# Patient Record
Sex: MALE | Race: WHITE
[De-identification: names, ages, dates, MRNs, and addresses within clinical notes are randomized per-mention and may not be internally consistent; named-entity substitution may affect disease eponyms.]

---

## 2018-03-06 ENCOUNTER — HOSPITAL ENCOUNTER (INPATIENT)
Dept: HOSPITAL 92 - SURG A | Age: 63
LOS: 15 days | Discharge: HOME | DRG: 470 | End: 2018-03-21
Attending: ORTHOPAEDIC SURGERY | Admitting: ORTHOPAEDIC SURGERY
Payer: COMMERCIAL

## 2018-03-06 ENCOUNTER — HOSPITAL ENCOUNTER (OUTPATIENT)
Dept: HOSPITAL 92 - LABBT | Age: 63
Discharge: HOME | End: 2018-03-06
Attending: ORTHOPAEDIC SURGERY
Payer: COMMERCIAL

## 2018-03-06 VITALS — BODY MASS INDEX: 30.3 KG/M2

## 2018-03-06 DIAGNOSIS — I25.10: ICD-10-CM

## 2018-03-06 DIAGNOSIS — Z01.818: Primary | ICD-10-CM

## 2018-03-06 DIAGNOSIS — M17.12: Primary | ICD-10-CM

## 2018-03-06 DIAGNOSIS — R73.03: ICD-10-CM

## 2018-03-06 DIAGNOSIS — M17.12: ICD-10-CM

## 2018-03-06 DIAGNOSIS — E05.90: ICD-10-CM

## 2018-03-06 DIAGNOSIS — Z96.651: ICD-10-CM

## 2018-03-06 DIAGNOSIS — I73.9: ICD-10-CM

## 2018-03-06 DIAGNOSIS — Z79.01: ICD-10-CM

## 2018-03-06 DIAGNOSIS — I10: ICD-10-CM

## 2018-03-06 DIAGNOSIS — G47.00: ICD-10-CM

## 2018-03-06 DIAGNOSIS — F32.9: ICD-10-CM

## 2018-03-06 DIAGNOSIS — E78.5: ICD-10-CM

## 2018-03-06 DIAGNOSIS — Z87.898: ICD-10-CM

## 2018-03-06 DIAGNOSIS — I48.0: ICD-10-CM

## 2018-03-06 PROCEDURE — C1713 ANCHOR/SCREW BN/BN,TIS/BN: HCPCS

## 2018-03-06 PROCEDURE — A4216 STERILE WATER/SALINE, 10 ML: HCPCS

## 2018-03-06 PROCEDURE — 93010 ELECTROCARDIOGRAM REPORT: CPT

## 2018-03-06 PROCEDURE — C1776 JOINT DEVICE (IMPLANTABLE): HCPCS

## 2018-03-06 PROCEDURE — A4306 DRUG DELIVERY SYSTEM <=50 ML: HCPCS

## 2018-03-06 PROCEDURE — 93005 ELECTROCARDIOGRAM TRACING: CPT

## 2018-03-06 PROCEDURE — 36415 COLL VENOUS BLD VENIPUNCTURE: CPT

## 2018-03-06 PROCEDURE — 87081 CULTURE SCREEN ONLY: CPT

## 2018-03-06 PROCEDURE — 85027 COMPLETE CBC AUTOMATED: CPT

## 2018-03-08 NOTE — EKG
Test Reason : 

Blood Pressure : ***/*** mmHG

Vent. Rate : 067 BPM     Atrial Rate : 067 BPM

   P-R Int : 152 ms          QRS Dur : 106 ms

    QT Int : 408 ms       P-R-T Axes : 052 073 066 degrees

   QTc Int : 431 ms

 

Normal sinus rhythm

Normal ECG

When compared with ECG of 18-APR-2014 06:52,

T wave amplitude has decreased in Anterior leads

Confirmed by DR. YOAN GUTIERRES ([id number]) on 3/8/2018 5:32:41 PM

 

Referred By:  TIN           Confirmed By:DR. YOAN GUTIERRES

## 2018-03-13 ENCOUNTER — HOSPITAL ENCOUNTER (OUTPATIENT)
Dept: HOSPITAL 92 - LABBT | Age: 63
Discharge: HOME | End: 2018-03-13
Attending: ORTHOPAEDIC SURGERY
Payer: COMMERCIAL

## 2018-03-13 DIAGNOSIS — M17.12: ICD-10-CM

## 2018-03-13 DIAGNOSIS — Z01.818: Primary | ICD-10-CM

## 2018-03-13 LAB
ANION GAP SERPL CALC-SCNC: 13 MMOL/L (ref 10–20)
APTT PPP: 29.8 SEC (ref 22.9–36.1)
BUN SERPL-MCNC: 21 MG/DL (ref 8.4–25.7)
CALCIUM SERPL-MCNC: 9.4 MG/DL (ref 7.8–10.44)
CHLORIDE SERPL-SCNC: 104 MMOL/L (ref 98–107)
CO2 SERPL-SCNC: 27 MMOL/L (ref 23–31)
CREAT CL PREDICTED SERPL C-G-VRATE: 0 ML/MIN (ref 70–130)
CRYSTAL-AUWI FLAG: 0 (ref 0–15)
GLUCOSE SERPL-MCNC: 143 MG/DL (ref 80–115)
HEV IGM SER QL: 0.5 (ref 0–7.99)
HGB BLD-MCNC: 15.3 G/DL (ref 14–18)
HYALINE CASTS #/AREA URNS LPF: (no result) LPF
INR PPP: 1
MCH RBC QN AUTO: 31 PG (ref 27–31)
MCV RBC AUTO: 92.9 FL (ref 80–94)
PATHC CAST-AUWI FLAG: 0 (ref 0–2.49)
PLATELET # BLD AUTO: 271 THOU/UL (ref 130–400)
POTASSIUM SERPL-SCNC: 4.1 MMOL/L (ref 3.5–5.1)
PROTHROMBIN TIME: 13.7 SEC (ref 12–14.7)
RBC # BLD AUTO: 4.94 MILL/UL (ref 4.7–6.1)
RBC UR QL AUTO: (no result) HPF (ref 0–3)
SODIUM SERPL-SCNC: 140 MMOL/L (ref 136–145)
SP GR UR STRIP: 1.03 (ref 1–1.04)
SPERM-AUWI FLAG: 0 (ref 0–9.9)
WBC # BLD AUTO: 6.7 THOU/UL (ref 4.8–10.8)
WBC UR QL AUTO: (no result) HPF (ref 0–3)
YEAST-AUWI FLAG: 0 (ref 0–25)

## 2018-03-13 PROCEDURE — 85027 COMPLETE CBC AUTOMATED: CPT

## 2018-03-13 PROCEDURE — 81001 URINALYSIS AUTO W/SCOPE: CPT

## 2018-03-13 PROCEDURE — 85610 PROTHROMBIN TIME: CPT

## 2018-03-13 PROCEDURE — 87081 CULTURE SCREEN ONLY: CPT

## 2018-03-13 PROCEDURE — 85730 THROMBOPLASTIN TIME PARTIAL: CPT

## 2018-03-13 PROCEDURE — 80048 BASIC METABOLIC PNL TOTAL CA: CPT

## 2018-03-13 PROCEDURE — 86901 BLOOD TYPING SEROLOGIC RH(D): CPT

## 2018-03-13 PROCEDURE — 86900 BLOOD TYPING SEROLOGIC ABO: CPT

## 2018-03-13 PROCEDURE — 86850 RBC ANTIBODY SCREEN: CPT

## 2018-03-15 NOTE — HP
HISTORY OF PRESENT ILLNESS:  The patient is a 62-year-old male with a long history of progressive deg
enerative arthritis of the left knee and wake up responsive, conservative treatment including rest, r
estriction of activities, pain medication, several injections, and lifestyle adjustments.  The pain i
s now interfering with day-to-day activities including walking, working, getting dressed.

 

PAST MEDICAL HISTORY:  Please see the old chart.  The patient has had previous right total knee repla
cement 5 years ago with good results.  He has a history of hypertension, atherosclerotic cardiovascul
ar disease, and has bilateral lower extremity stents for which he takes Plavix.  He also has a histor
y of possible atrial fibrillation, hypothyroidism, and hepatitis A.

 

CURRENT MEDICATIONS:  _____, Crestor, Plavix, which he stopped 1 week preoperatively.  Lisinopril, Mi
rapex, and Flomax, Ambien, Wellbutrin, and hydrocodone.

 

FAMILY HISTORY, SOCIAL HISTORY, REVIEW OF SYSTEMS:  Otherwise unremarkable.

 

PHYSICAL EXAMINATION:

GENERAL:  He is a healthy male.

HEENT:  Unremarkable.

NECK:  Supple.

CHEST:  Clear.

HEART:  Regular rate and rhythm.

ABDOMEN:  Soft, nontender.

RECTAL/GENITAL:  Deferred.

EXTREMITIES:  Pertinent findings on the left knee.  There is puffiness but no definite effusion.  The
re is mild varus deformity.  There is tenderness over the medial joint line.  Range of motion is 5-11
5 degrees.  There is _____ flexion.  There is no instability.  I cannot palpate distal pulses, but go
od capillary refill.  He has a left antalgic gait.

NEUROVASCULAR:  Intact.

 

LABORATORY AND X-RAY FINDINGS:  X-rays of left knee reveal tricompartmental DJD and narrowing mediall
y and laterally and benign chondral lesion of the proximal tibia, which is unchanged on serial x-rays
.

 

IMPRESSION:

1.  Degenerative joint disease, left knee.

2.  Status post right total knee replacement.

3.  Peripheral vascular disease with bilateral lower extremity stents.

4.  History of hypertension.

 

PLAN:  Left total knee replacement.  The nature of the surgery, length of recovery, and potential com
plications such as infection, loss of motion, incomplete relief, delayed wound healing, neurovascular
 injury, thromboembolic phenomena, possible transfusion, and need for revision discussed in detail.  
Because of the previous vascular stents, we will plan on not using a tourniquet during the procedure.

## 2018-03-19 PROCEDURE — 0SRD0J9 REPLACEMENT OF LEFT KNEE JOINT WITH SYNTHETIC SUBSTITUTE, CEMENTED, OPEN APPROACH: ICD-10-PCS | Performed by: ORTHOPAEDIC SURGERY

## 2018-03-19 PROCEDURE — 3E0T3BZ INTRODUCTION OF ANESTHETIC AGENT INTO PERIPHERAL NERVES AND PLEXI, PERCUTANEOUS APPROACH: ICD-10-PCS | Performed by: ORTHOPAEDIC SURGERY

## 2018-03-19 RX ADMIN — HYDROCODONE BITARTRATE AND ACETAMINOPHEN PRN TAB: 10; 325 TABLET ORAL at 11:40

## 2018-03-19 RX ADMIN — HYDROCODONE BITARTRATE AND ACETAMINOPHEN PRN TAB: 10; 325 TABLET ORAL at 20:48

## 2018-03-19 RX ADMIN — Medication SCH: at 20:53

## 2018-03-19 RX ADMIN — Medication SCH: at 10:54

## 2018-03-19 RX ADMIN — ASPIRIN SCH MG: 81 TABLET ORAL at 20:49

## 2018-03-19 RX ADMIN — Medication SCH GM: at 16:20

## 2018-03-19 RX ADMIN — HYDROCODONE BITARTRATE AND ACETAMINOPHEN PRN TAB: 10; 325 TABLET ORAL at 16:31

## 2018-03-19 NOTE — OP
DATE OF PROCEDURE:  03/19/2018

 

SURGEON:  Barry Galvin M.D.

 

ASSISTANT:  WENDY Gonsalez.

 

ANESTHESIA:  General plus femoral sciatic nerve block.

 

PREOPERATIVE DIAGNOSIS:  Degenerative arthritis, left knee.

 

POSTOPERATIVE DIAGNOSIS:  Degenerative arthritis, left knee.

 

PROCEDURES:  His Left total knee replacement with computer-assisted navigation with cemented [employer] 
Triathlon components (#6 femur, #6 universal tibial baseplate with 11 mm CS plastic insert, and A38 a
ll plastic patellar component).  

 

NARRATIVE REPORT:  After satisfactory anesthesia was induced in the supine position, the patient was 
prepped and draped in routine manner.  Sequential compression device was used on the non-operative le
g throughout the procedure.  The tourniquet was not used.  He was approached through a gently curved 
medial parapatellar incision, it was carried down to subcutaneous tissues.  Bleeding points controlle
d with Bovie cautery.  Medial parapatellar arthrotomy was performed, patella dislocated laterally and
 portions of the fat pad were excised for exposure.  There was marked degenerative arthritis of the k
nee, especially medially, with areas of exposed bone.  Meniscal remnants and osteophytes were removed
.  Using the [employer] pinless navigation system and the appropriate guides, the distal femoral and pro
ximal tibial articular surfaces were excised to accept the trial components.  It was felt that #6 fem
oral component, #6 tibial baseplate with 9 mm CS plastic insert gave appropriate size, fit, and stabi
lity.  The patellar articular surface was excised to accept an all plastic A38 patellar component.  T
here was good range of motion and good patellar tracking.  The trial components removed.  The knee wa
s copiously irrigated with the pulsatile lavage and bony surfaces thoroughly cleaned and dried.  The 
permanent components were then cemented in a single stage using 1 package of cement premixed with 1 g
tomer of tobramycin powder.  Excess cement was removed.  There was again good fit and stability.  The w
ound was copiously irrigated.  The skin was infiltrated with a mixture 0.25% Marcaine with epinephrin
e and 1% lidocaine with epinephrine.  The medial retinaculum and quadriceps mechanism was closed with
 interrupted #2 Vicryl and a running #2 Quill.  Subcutaneous tissues were closed with a running subcu
ticular 0 Quill and the skin closed with a running subcuticular 3-0 Monoderm and SurgiSeal skin adhes
kerrie.  A sterile bulky compressive dressing was applied and the patient awakened, taken to recovery ro
om in stable condition.  There were no apparent intraoperative complications.  The estimated blood lo
ss was 250 mL.  The tourniquet time was zero.

## 2018-03-19 NOTE — RAD
LEFT KNEE 2 VIEWS:

 

Date:  03/19/18 

 

HISTORY:  

Status post arthroplasty. 

 

COMPARISON:  

None. 

 

FINDINGS:

There are postoperative changes compatible with left knee arthroplasty. Alignment is near anatomic. V
ascular stent is noted. 

 

IMPRESSION: 

Postoperative changes. 

 

 

POS: [facility]

## 2018-03-19 NOTE — PDOC.PN
- Subjective


Encounter Start Date: 03/19/18


Encounter Start Time: 11:00


Subjective: no chest pain or sob


-: left leg is still numb


-: is able to move toes and extremities, has nerve block





- Objective


MAR Reviewed: Yes


Vital Signs & Weight: 


 Weight











Weight                         230 lb

















Phys Exam





- Physical Examination


HEENT: PERRLA, moist MMs


Neck: no JVD, supple


Respiratory: no wheezing, no rales


Cardiovascular: RRR, no significant murmur


Gastrointestinal: soft, non-tender, positive bowel sounds


Musculoskeletal: no edema, pulses present


Neurological: non-focal, moves all 4 limbs


Psychiatric: A&O x 3





Dx/Plan


(1) Status post left knee replacement


Code(s): Z96.652 - PRESENCE OF LEFT ARTIFICIAL KNEE JOINT   Status: Acute   





(2) PVD (peripheral vascular disease)


Code(s): I73.9 - PERIPHERAL VASCULAR DISEASE, UNSPECIFIED   Status: Chronic   

Comment: stents in B/L LE-   





(3) HTN (hypertension)


Code(s): I10 - ESSENTIAL (PRIMARY) HYPERTENSION   Status: Chronic   


Qualifiers: 


   Hypertension type: essential hypertension   Qualified Code(s): I10 - 

Essential (primary) hypertension   





(4) Dyslipidemia


Code(s): E78.5 - HYPERLIPIDEMIA, UNSPECIFIED   Status: Chronic   





(5) Hyperthyroidism


Code(s): E05.90 - THYROTOXICOSIS, UNSP WITHOUT THYROTOXIC CRISIS OR STORM   

Status: Chronic   Comment: f/u with Dr.Zia hernandez in [facility]   





(6) Atrial fibrillation


Code(s): I48.91 - UNSPECIFIED ATRIAL FIBRILLATION   Status: Chronic   


Qualifiers: 


   Atrial fibrillation type: paroxysmal   Qualified Code(s): I48.0 - Paroxysmal 

atrial fibrillation   





- Plan


on asp bid for dvt prophylaxis post knee surgery


-: continue lisinopril, methimazole, flomax and crestor


-: ropivacaine nr block, fentanyl, ultram and norco prn for pain


-: will f/u





* .








Review of Systems





- Medications/Allergies


Allergies/Adverse Reactions: 


 Allergies











Allergy/AdvReac Type Severity Reaction Status Date / Time


 


No Known Drug Allergies Allergy   Verified 03/06/18 11:59











Medications: 


 Current Medications





Acetaminophen (Tylenol)  650 mg PO Q4H PRN


   PRN Reason: HA/ T > 101F; Mild Pain (1-3)


Hydrocodone Bitart/Acetaminophen (Norco 10/325)  1 tab PO Q4H PRN


   PRN Reason: Pain (1-3)


Hydrocodone Bitart/Acetaminophen (Norco 10/325)  2 tab PO Q4H PRN


   PRN Reason: PAIN (4-6)


Aspirin (Ecotrin)  81 mg PO BID [facility]


Bupropion HCl (Wellbutrin Xl)  150 mg PO QAM [facility]


Cefazolin Sodium (Ancef)  2 gm SLOW IVP 0000,1600 [facility]


   Stop: 03/20/18 00:01


Diphenhydramine HCl (Benadryl)  25 mg PO Q6H PRN


   PRN Reason: Itching


Fentanyl (Sublimaze)  50 mcg IV Q1H PRN


   PRN Reason:  BREAKTHROUGH PAIN


   Last Admin: 03/19/18 10:30 Dose:  50 mcg


Ferrous Gluconate (Fergon)  324 mg PO BID [facility]


Fluoxetine HCl (Prozac)  20 mg PO DAILY [facility]


Ropivacaine (Ropivacaine 0.2% 550 Ml)  550 mls @ 0 mls/hr NERVE BLCK INF [facility]


   PRN Reason: As Directed


Tranexamic Acid 1,000 mg/ (Sodium Chloride)  110 mls @ 200 mls/hr IVPB ONE [facility]


   Stop: 03/19/18 12:00


Sodium Chloride (Normal Saline 0.9%)  1,000 mls @ 100 mls/hr IV .Q10H [facility]


   Last Admin: 03/19/18 10:54 Dose:  Not Given


Tranexamic Acid 1,000 mg/ (Sodium Chloride)  110 mls @ 200 mls/hr IVPB ONE [facility]


   Stop: 03/19/18 12:00


Vancomycin HCl 1.5 gm/ Sodium (Chloride)  300 mls @ 200 mls/hr IVPB 1800 [facility]


   Stop: 03/19/18 19:29


Iron/Minerals/Multivitamins (Theragran M)  1 tab PO DAILY [facility]


Ketorolac Tromethamine (Toradol)  30 mg IVP Q6H PRN


   PRN Reason: Moderate Pain (4-6)


   Stop: 03/22/18 07:13


Lisinopril (Zestril)  20 mg PO QAM [facility]


Methimazole ()  5 mg PO QAM [facility]


Ondansetron HCl (Zofran)  4 mg IVP Q6H PRN


   PRN Reason: Nausea/Vomiting


Pramipexole Dihydrochloride (Mirapex)  2 mg PO HS [facility]


Promethazine HCl (Phenergan)  12.5 mg IM Q4H PRN


   PRN Reason: Nausea


Rosuvastatin Calcium (Crestor)  20 mg PO HS [facility]


Senna/Docusate Sodium (Senokot S)  2 tab PO BID [facility]


Sodium Chloride (Flush - Normal Saline)  10 ml IVF Q12HR [facility]


   Last Admin: 03/19/18 10:54 Dose:  Not Given


Sodium Chloride (Flush - Normal Saline)  10 ml IVF PRN PRN


   PRN Reason: Saline Flush


Sodium Chloride (Flush - Normal Saline)  10 ml IVF PRN PRN


   PRN Reason: Saline Flush


Tamsulosin HCl (Flomax)  0.4 mg PO HS [facility]


Tramadol HCl (Ultram)  50 mg PO Q6H PRN


   PRN Reason: Mild Pain (1-3)


Tramadol HCl (Ultram)  100 mg PO Q6H PRN


   PRN Reason: Moderate Pain 4-6


Zolpidem Tartrate (Ambien)  5 mg PO HSPRN PRN


   PRN Reason: Insomnia

## 2018-03-20 LAB
HGB BLD-MCNC: 12.7 G/DL (ref 14–18)
MCH RBC QN AUTO: 30.2 PG (ref 27–31)
MCV RBC AUTO: 93.4 FL (ref 80–94)
PLATELET # BLD AUTO: 248 THOU/UL (ref 130–400)
RBC # BLD AUTO: 4.21 MILL/UL (ref 4.7–6.1)
WBC # BLD AUTO: 11.6 THOU/UL (ref 4.8–10.8)

## 2018-03-20 RX ADMIN — HYDROCODONE BITARTRATE AND ACETAMINOPHEN PRN TAB: 10; 325 TABLET ORAL at 05:53

## 2018-03-20 RX ADMIN — FENTANYL CITRATE SCH MLS: 50 INJECTION, SOLUTION INTRAMUSCULAR; INTRAVENOUS at 11:22

## 2018-03-20 RX ADMIN — ASPIRIN SCH MG: 81 TABLET ORAL at 21:31

## 2018-03-20 RX ADMIN — Medication SCH ML: at 21:33

## 2018-03-20 RX ADMIN — DOCUSATE SODIUM 50 MG AND SENNOSIDES 8.6 MG SCH TAB: 8.6; 5 TABLET, FILM COATED ORAL at 08:17

## 2018-03-20 RX ADMIN — MULTIPLE VITAMINS W/ MINERALS TAB SCH TAB: TAB at 08:16

## 2018-03-20 RX ADMIN — HYDROCODONE BITARTRATE AND ACETAMINOPHEN PRN TAB: 10; 325 TABLET ORAL at 02:03

## 2018-03-20 RX ADMIN — Medication SCH ML: at 08:18

## 2018-03-20 RX ADMIN — BUPROPION HYDROCHLORIDE SCH MG: 150 TABLET, FILM COATED, EXTENDED RELEASE ORAL at 08:17

## 2018-03-20 RX ADMIN — ASPIRIN SCH MG: 81 TABLET ORAL at 08:16

## 2018-03-20 RX ADMIN — FENTANYL CITRATE SCH MLS: 50 INJECTION, SOLUTION INTRAMUSCULAR; INTRAVENOUS at 19:17

## 2018-03-20 RX ADMIN — DOCUSATE SODIUM 50 MG AND SENNOSIDES 8.6 MG SCH TAB: 8.6; 5 TABLET, FILM COATED ORAL at 21:31

## 2018-03-20 RX ADMIN — Medication SCH GM: at 00:23

## 2018-03-20 NOTE — PDOC.PN
- Subjective


Encounter Start Date: 03/20/18


Encounter Start Time: 11:00





Patient is seen today, alert and oriented. C/o severe pain to right Knee post Op

, he is given multiple pain meds remains 9/10 intensity, low pain tolerance.





- Objective


MAR Reviewed: Yes


Vital Signs & Weight: 


 Vital Signs (12 hours)











  Temp Pulse Resp BP BP Pulse Ox


 


 03/20/18 15:09  98.2 F  78  16   157/81 H  96


 


 03/20/18 12:00   72    


 


 03/20/18 11:22   72  16   154/71 H  97


 


 03/20/18 11:15  97.5 F L  71  18   164/73 H  94 L


 


 03/20/18 08:17     170/74 H  


 


 03/20/18 07:50  98.0 F  81  18    93 L


 


 03/20/18 07:30  98.0 F  81  18   170/74 H  90 L








 Weight











Admit Weight                   230 lb


 


Weight                         230 lb














I&O: 


 











 03/19/18 03/20/18 03/21/18





 06:59 06:59 06:59


 


Intake Total  2644 


 


Output Total  1600 


 


Balance  1044 











Result Diagrams: 


 03/20/18 05:35





Radiology Reviewed by me: Yes


EKG Reviewed by me: Yes





Phys Exam





- Physical Examination


HEENT: PERRLA, moist MMs


Neck: no nodes, no JVD


Respiratory: no wheezing, no rales


Cardiovascular: RRR, no significant murmur


Gastrointestinal: soft, non-tender


Musculoskeletal: no edema, pulses present





Dx/Plan


(1) Status post left knee replacement


Code(s): Z96.652 - PRESENCE OF LEFT ARTIFICIAL KNEE JOINT   Status: Acute   

Comment: Pain is poorly controlled, Discussed with ortho, plan for PCA morphine

   





(2) Dyslipidemia


Code(s): E78.5 - HYPERLIPIDEMIA, UNSPECIFIED   Status: Chronic   Comment: 

continue with Statin.   





(3) HTN (hypertension)


Code(s): I10 - ESSENTIAL (PRIMARY) HYPERTENSION   Status: Chronic   


Qualifiers: 


   Hypertension type: essential hypertension   Qualified Code(s): I10 - 

Essential (primary) hypertension   


Comment: poorly controlled likely from pain, increased lisinopril to 20mg BID   





(4) Hyperthyroidism


Code(s): E05.90 - THYROTOXICOSIS, UNSP WITHOUT THYROTOXIC CRISIS OR STORM   

Status: Chronic   Comment: f/u with Dr.Zia hernandez in [facility]   





(5) Atrial fibrillation


Code(s): I48.91 - UNSPECIFIED ATRIAL FIBRILLATION   Status: Chronic   


Qualifiers: 


   Atrial fibrillation type: paroxysmal   Qualified Code(s): I48.0 - Paroxysmal 

atrial fibrillation   





- Plan


cont current plan of care, plan discussed w/ family, PT/OT, , 

respiratory therapy, incentive spirometry, DVT proph w/lovenox





* .








- Discharge Day


Encounter end time: 11:35





Review of Systems





- Review of Systems


Eyes: negative: Pain, Vision Change, Conjunctivae Inflammation, Eyelid 

Inflammation, Redness, Other


ENT: negative: Ear Pain, Ear Discharge, Nose Pain, Nose Discharge, Nose 

Congestion, Mouth Pain, Mouth Swelling, Throat Pain, Throat Swelling, Other


Respiratory: negative: Cough, Dry, Shortness of Breath, Hemoptysis, SOB with 

Excertion, Pleuritic Pain, Sputum, Wheezing


Cardiovascular: negative: chest pain, palpitations, orthopnea, paroxysmal 

nocturnal dyspnea, edema, light headedness, other


Gastrointestinal: negative: Nausea, Vomiting, Abdominal Pain, Diarrhea, 

Constipation, Melena, Hematochezia, Other


Musculoskeletal: Leg Pain


Skin: negative: Rash, Lesions, Theron, Bruising, Other


Neurological: negative: Weakness, Numbness, Incoordination, Change in Speech, 

Confusion, Seizures, Other





- Medications/Allergies


Allergies/Adverse Reactions: 


 Allergies











Allergy/AdvReac Type Severity Reaction Status Date / Time


 


No Known Drug Allergies Allergy   Verified 03/06/18 11:59











Medications: 


 Current Medications





Acetaminophen (Tylenol)  650 mg PO Q4H PRN


   PRN Reason: HA/ T > 101F; Mild Pain (1-3)


Aspirin (Ecotrin)  81 mg PO BID [facility]


   Last Admin: 03/20/18 08:16 Dose:  81 mg


Bupropion HCl (Wellbutrin Xl)  150 mg PO QAM [facility]


   Last Admin: 03/20/18 08:17 Dose:  150 mg


Diphenhydramine HCl (Benadryl)  25 mg IM/IV Q3H PRN


   PRN Reason: Itching


Diphenhydramine HCl (Benadryl)  25 mg PO Q3H PRN


   PRN Reason: Itching


Ferrous Gluconate (Fergon)  324 mg PO BID [facility]


   Last Admin: 03/20/18 08:16 Dose:  324 mg


Fluoxetine HCl (Prozac)  20 mg PO DAILY [facility]


   Last Admin: 03/20/18 08:17 Dose:  20 mg


Ropivacaine (Ropivacaine 0.2% 550 Ml)  550 mls @ 0 mls/hr NERVE BLCK INF [facility]


   PRN Reason: As Directed


Sodium Chloride (Normal Saline 0.9%)  1,000 mls @ 100 mls/hr IV .Q10H [facility]


   Last Admin: 03/20/18 14:51 Dose:  Not Given


Acetaminophen 1,000 mg/ Device  100 mls @ 400 mls/hr IVPB Q6HR [facility]


   Stop: 03/23/18 12:01


   Last Admin: 03/20/18 11:04 Dose:  100 mls


Fentanyl Citrate 2,000 mcg/ (Sodium Chloride)  100 mls @ 0 mls/hr IV INF [facility]


   PRN Reason: As Directed


   Last Admin: 03/20/18 11:22 Dose:  100 mls


Iron/Minerals/Multivitamins (Theragran M)  1 tab PO DAILY [facility]


   Last Admin: 03/20/18 08:16 Dose:  1 tab


Ketorolac Tromethamine (Toradol)  30 mg IVP Q6HR [facility]


   Stop: 03/22/18 06:01


   Last Admin: 03/20/18 11:30 Dose:  30 mg


Lisinopril (Zestril)  20 mg PO BID [facility]


Methimazole ()  5 mg PO QAM [facility]


   Last Admin: 03/20/18 08:17 Dose:  5 mg


Naloxone HCl (Narcan)  0.2 mg IV Q5MIN PRN


   PRN Reason: RR <8 or pt obtun/unarousable


Ondansetron HCl (Zofran)  4 mg IVP Q6H PRN


   PRN Reason: Nausea/Vomiting


Pramipexole Dihydrochloride (Mirapex)  2 mg PO HS [facility]


   Last Admin: 03/19/18 20:48 Dose:  2 mg


Promethazine HCl (Phenergan)  12.5 mg IM Q4H PRN


   PRN Reason: Nausea/Vomiting


Rosuvastatin Calcium (Crestor)  20 mg PO [facility]


   Last Admin: 03/19/18 20:49 Dose:  20 mg


Senna/Docusate Sodium (Senokot S)  2 tab PO BID [facility]


   Last Admin: 03/20/18 08:17 Dose:  2 tab


Sodium Chloride (Flush - Normal Saline)  10 ml IVF Q12HR [facility]


   Last Admin: 03/20/18 08:18 Dose:  10 ml


Sodium Chloride (Flush - Normal Saline)  10 ml IVF PRN PRN


   PRN Reason: Saline Flush


Tamsulosin HCl (Flomax)  0.4 mg PO HS [facility]


   Last Admin: 03/19/18 20:49 Dose:  0.4 mg


Zolpidem Tartrate (Ambien)  5 mg PO HSPRN PRN


   PRN Reason: Insomnia

## 2018-03-21 VITALS — SYSTOLIC BLOOD PRESSURE: 164 MMHG | DIASTOLIC BLOOD PRESSURE: 80 MMHG

## 2018-03-21 VITALS — TEMPERATURE: 98.1 F

## 2018-03-21 RX ADMIN — BUPROPION HYDROCHLORIDE SCH MG: 150 TABLET, FILM COATED, EXTENDED RELEASE ORAL at 08:27

## 2018-03-21 RX ADMIN — Medication SCH: at 08:32

## 2018-03-21 RX ADMIN — MULTIPLE VITAMINS W/ MINERALS TAB SCH TAB: TAB at 08:27

## 2018-03-21 RX ADMIN — ASPIRIN SCH MG: 81 TABLET ORAL at 08:28

## 2018-03-21 RX ADMIN — DOCUSATE SODIUM 50 MG AND SENNOSIDES 8.6 MG SCH TAB: 8.6; 5 TABLET, FILM COATED ORAL at 08:25

## 2018-03-23 ENCOUNTER — HOSPITAL ENCOUNTER (OUTPATIENT)
Dept: HOSPITAL 92 - ERS | Age: 63
Setting detail: OBSERVATION
LOS: 1 days | Discharge: HOME | End: 2018-03-24
Attending: ORTHOPAEDIC SURGERY | Admitting: ORTHOPAEDIC SURGERY
Payer: COMMERCIAL

## 2018-03-23 VITALS — BODY MASS INDEX: 31.8 KG/M2

## 2018-03-23 DIAGNOSIS — Z90.49: ICD-10-CM

## 2018-03-23 DIAGNOSIS — Z96.652: ICD-10-CM

## 2018-03-23 DIAGNOSIS — R52: Primary | ICD-10-CM

## 2018-03-23 DIAGNOSIS — Z98.890: ICD-10-CM

## 2018-03-23 DIAGNOSIS — E78.00: ICD-10-CM

## 2018-03-23 DIAGNOSIS — Z79.899: ICD-10-CM

## 2018-03-23 DIAGNOSIS — E78.5: ICD-10-CM

## 2018-03-23 DIAGNOSIS — I73.9: ICD-10-CM

## 2018-03-23 DIAGNOSIS — I10: ICD-10-CM

## 2018-03-23 LAB
ALBUMIN SERPL BCG-MCNC: 3.6 G/DL (ref 3.4–4.8)
ALP SERPL-CCNC: 56 U/L (ref 40–150)
ALT SERPL W P-5'-P-CCNC: 12 U/L (ref 8–55)
ANION GAP SERPL CALC-SCNC: 12 MMOL/L (ref 10–20)
AST SERPL-CCNC: 20 U/L (ref 5–34)
BASOPHILS # BLD AUTO: 0.1 THOU/UL (ref 0–0.2)
BASOPHILS NFR BLD AUTO: 0.8 % (ref 0–1)
BILIRUB SERPL-MCNC: 0.6 MG/DL (ref 0.2–1.2)
BUN SERPL-MCNC: 19 MG/DL (ref 8.4–25.7)
CALCIUM SERPL-MCNC: 9.4 MG/DL (ref 7.8–10.44)
CHLORIDE SERPL-SCNC: 101 MMOL/L (ref 98–107)
CO2 SERPL-SCNC: 28 MMOL/L (ref 23–31)
CREAT CL PREDICTED SERPL C-G-VRATE: 0 ML/MIN (ref 70–130)
EOSINOPHIL # BLD AUTO: 0.2 THOU/UL (ref 0–0.7)
EOSINOPHIL NFR BLD AUTO: 2 % (ref 0–10)
GLOBULIN SER CALC-MCNC: 3.1 G/DL (ref 2.4–3.5)
GLUCOSE SERPL-MCNC: 105 MG/DL (ref 80–115)
GLUCOSE SNV-MCNC: 62 MG/DL
HGB BLD-MCNC: 12.3 G/DL (ref 14–18)
LYMPHOCYTES # BLD: 2 THOU/UL (ref 1.2–3.4)
LYMPHOCYTES NFR BLD AUTO: 18.4 % (ref 21–51)
MCH RBC QN AUTO: 30.9 PG (ref 27–31)
MCV RBC AUTO: 93.9 FL (ref 80–94)
MONOCYTES # BLD AUTO: 1.2 THOU/UL (ref 0.11–0.59)
MONOCYTES NFR BLD AUTO: 10.9 % (ref 0–10)
NEUTROPHILS # BLD AUTO: 7.2 THOU/UL (ref 1.4–6.5)
NEUTROPHILS NFR BLD AUTO: 67.9 % (ref 42–75)
NEUTROPHILS NFR FLD: 97 %
NONHEMATIC CELLS NFR FLD MANUAL: 2 %
PLATELET # BLD AUTO: 334 THOU/UL (ref 130–400)
POTASSIUM SERPL-SCNC: 4.4 MMOL/L (ref 3.5–5.1)
PROT SNV-MCNC: 3.8 G/DL
RBC # BLD AUTO: 3.99 MILL/UL (ref 4.7–6.1)
RBC # FLD AUTO: (no result) /CUMM
RBC BACKGROUND COUNT: 0
SODIUM SERPL-SCNC: 137 MMOL/L (ref 136–145)
WBC # BLD AUTO: 10.6 THOU/UL (ref 4.8–10.8)
WBC # FLD AUTO: 2420 /CUMM
WBC BACKGROUND COUNT: 0.01

## 2018-03-23 PROCEDURE — 87102 FUNGUS ISOLATION CULTURE: CPT

## 2018-03-23 PROCEDURE — 85060 BLOOD SMEAR INTERPRETATION: CPT

## 2018-03-23 PROCEDURE — 89051 BODY FLUID CELL COUNT: CPT

## 2018-03-23 PROCEDURE — 96375 TX/PRO/DX INJ NEW DRUG ADDON: CPT

## 2018-03-23 PROCEDURE — 87116 MYCOBACTERIA CULTURE: CPT

## 2018-03-23 PROCEDURE — 84157 ASSAY OF PROTEIN OTHER: CPT

## 2018-03-23 PROCEDURE — 96361 HYDRATE IV INFUSION ADD-ON: CPT

## 2018-03-23 PROCEDURE — 85652 RBC SED RATE AUTOMATED: CPT

## 2018-03-23 PROCEDURE — 87070 CULTURE OTHR SPECIMN AEROBIC: CPT

## 2018-03-23 PROCEDURE — 87040 BLOOD CULTURE FOR BACTERIA: CPT

## 2018-03-23 PROCEDURE — 82945 GLUCOSE OTHER FLUID: CPT

## 2018-03-23 PROCEDURE — 87206 SMEAR FLUORESCENT/ACID STAI: CPT

## 2018-03-23 PROCEDURE — 36415 COLL VENOUS BLD VENIPUNCTURE: CPT

## 2018-03-23 PROCEDURE — G0378 HOSPITAL OBSERVATION PER HR: HCPCS

## 2018-03-23 PROCEDURE — 86140 C-REACTIVE PROTEIN: CPT

## 2018-03-23 PROCEDURE — 96365 THER/PROPH/DIAG IV INF INIT: CPT

## 2018-03-23 PROCEDURE — 85025 COMPLETE CBC W/AUTO DIFF WBC: CPT

## 2018-03-23 PROCEDURE — 80053 COMPREHEN METABOLIC PANEL: CPT

## 2018-03-23 PROCEDURE — 87205 SMEAR GRAM STAIN: CPT

## 2018-03-23 RX ADMIN — HYDROCODONE BITARTRATE AND ACETAMINOPHEN PRN TAB: 10; 325 TABLET ORAL at 20:18

## 2018-03-23 RX ADMIN — VANCOMYCIN HYDROCHLORIDE SCH: 1 INJECTION, SOLUTION INTRAVENOUS at 19:53

## 2018-03-23 RX ADMIN — CLINDAMYCIN PHOSPHATE SCH MLS: 900 INJECTION, SOLUTION INTRAVENOUS at 23:24

## 2018-03-23 NOTE — OP
DATE OF PROCEDURE:  03/23/2018

 

PREOPERATIVE DIAGNOSIS:  Possible septic left knee, status post total knee arthroplasty.

 

POSTOPERATIVE DIAGNOSIS:  Possible septic left knee, status post total knee arthroplasty.

 

SURGICAL PROCEDURE:  Aspiration of left knee.

 

ANESTHESIA:  3 mL of 1% lidocaine plain local.

 

SURGEON:  Dillan Frances M.D.

 

INDICATIONS:  The patient is a pleasant 62-year-old gentleman now 4 days status post total knee arthr
oplasty, who presents with a 24-hour history of increasing pain and redness along the medial thigh.  
After discussion with patient including risks and benefits, we decided to proceed with an aspirate of
 the knee to rule out intra-articular infection.

 

DESCRIPTION OF PROCEDURE:  After obtaining informed consent, a sterile prep was performed of the late
ral thigh which was the side of the knee away from the cellulitic response.  Following a Betadine pre
p, local skin anesthesia was achieved with 3 mL of 1% lidocaine.  Next, an 18 gauge needle was introd
uced into the knee and approximately 15 mL of bloody fluid was aspirated from the knee.  This fluid g
iven to nursing for labs to include cell count, glucose, protein, Gram stain, culture and sensitivity
 including acid fast bacilli and fungus.  Following aspiration, skin was cleaned with alcohol and gau
ze and tape dressing applied to the aspiration site.  There were no complications.  The patient angelito
ated the procedure well.

## 2018-03-23 NOTE — ULT
LEFT LOWER EXTREMITY VENOUS ULTRASOUND WITH DOPPLERE:

 

Date:  03/23/18 

 

HISTORY:  

Status post knee replacement 4 days ago. Pain. 

 

COMPARISON: 

None. 

 

TECHNIQUE:  

Gray scale, color flow, Doppler imaging, and spectral waveform analysis performed of the left lower e
xtremity deep venous system. 

 

FINDINGS:

There is compressibility, presence of flow, and augmentation in the common femoral, femoral vein, and
 popliteal vein. There is flow in the greater saphenous vein, profunda vein, and posterior tibial vei
n. 

 

IMPRESSION: 

No evidence of thrombus in the left lower extremity deep venous system.  

 

POS: [facility]

## 2018-03-23 NOTE — HP
DATE OF ADMISSION:  To observation status is going to be 03/23/2018

 

BRIEF HISTORY OF PRESENT ILLNESS:  Patient is a pleasant 62-year-old gentleman who is now status post
 left total knee arthroplasty on 03/19/2018 with Dr. Barry Galvin.  The patient had an uneventful surge
ry and was discharged to home on 03/21/2018.  He now presents to the emergency room with a 24-hour hi
story of increasing medial thigh pain and redness.  He does not report significant fevers, but does h
ave a woody induration along the medial thigh that concerned his physical therapist and as such, the 
patient now presents to the emergency room.  The patient has had a CBC drawn and has a white count of
 10.  That said, he does clearly have erythema along the medial thigh.  Workup in the emergency room 
has included an aspiration of the knee as well as an ordered ultrasound for the leg to rule out venou
s thrombosis.  These results are not back yet.  The patient now is being placed on observation status
 while we await the outcome of the studies and to begin antibiotic therapy for presumed cellulitis.

 

PAST MEDICAL HISTORY:  Hyperlipidemia, high cholesterol, hypertension, peripheral vascular disease.

 

PAST SURGICAL HISTORY:  Includes cholecystectomy, bilateral leg stents, and recent left total knee ar
throplasty.

 

MEDICATIONS:  Prozac, Crestor, Plavix, lisinopril, Ambien, Norco, methimazole, Flomax.

 

ALLERGIES:  None known.

 

FAMILY HISTORY:  Noncontributory.

 

SOCIAL HISTORY:  The patient does not have a smoking history.  Denies alcohol use or drug use.

 

PHYSICAL EXAMINATION:

VITAL SIGNS:  Temperature of 98.5, heart rate of 73, respiratory rate of 16, and blood pressure 124/7
5.

HEENT:  Atraumatic, normocephalic.

HEART:  Shows a regular rate and rhythm without murmur.

LUNGS:  Clear to auscultation bilaterally with good breath sounds.

ABDOMEN:  Round and nontender.  Pelvis is stable.

EXTREMITIES:  Remarkable for left lower extremity with a midline anterior knee incision from total kn
ee arthroplasty.  There are some areas of dependent bruising.  He was found to have medial thigh eryt
hema with a woody type feel to the skin and pain to palpation at the area of redness.  His calf is so
ft and nontender.  Distal neurovascular exam is intact.

 

LABORATORY DATA:  He was found to have a white count of 10.6, hematocrit 37.5 and 334,000 platelets. 
 He has a sedimentation rate of 33.  He was found to have a C-reactive protein of 18.68.  The [facility] complete metabolic panel is normal.

 

ASSESSMENT:  A 62-year-old gentleman with recent history of left total knee arthroplasty, now with me
dial erythema and woody induration of the skin consistent with cellulitis.

 

PLAN:  At this time, patient is being placed on observation status.  An aspiration of the knee was pe
rformed with Betadine prep.  Pending the outcome of this fluid analysis with chemistries, glucose and
 Gram stain, we will make decision on whether further intervention of the knee is necessary.  The pat
ient will be given regular diet, placed n.p.o. after midnight should the labs come back suspicious fo
r intra-articular infection.  I have discussed the case with Dr. Galvin by phone.  Patient appears com
fortable with our discussion and plan.

## 2018-03-24 VITALS — DIASTOLIC BLOOD PRESSURE: 79 MMHG | TEMPERATURE: 98 F | SYSTOLIC BLOOD PRESSURE: 148 MMHG

## 2018-03-24 LAB
BASOPHILS # BLD AUTO: 0 THOU/UL (ref 0–0.2)
BASOPHILS NFR BLD AUTO: 0.4 % (ref 0–1)
EOSINOPHIL # BLD AUTO: 0.3 THOU/UL (ref 0–0.7)
EOSINOPHIL NFR BLD AUTO: 2.9 % (ref 0–10)
HGB BLD-MCNC: 11.3 G/DL (ref 14–18)
LYMPHOCYTES # BLD: 1.6 THOU/UL (ref 1.2–3.4)
LYMPHOCYTES NFR BLD AUTO: 17.9 % (ref 21–51)
MCH RBC QN AUTO: 31.5 PG (ref 27–31)
MCV RBC AUTO: 94.6 FL (ref 80–94)
MONOCYTES # BLD AUTO: 1 THOU/UL (ref 0.11–0.59)
MONOCYTES NFR BLD AUTO: 10.9 % (ref 0–10)
NEUTROPHILS # BLD AUTO: 6.1 THOU/UL (ref 1.4–6.5)
NEUTROPHILS NFR BLD AUTO: 67.8 % (ref 42–75)
PLATELET # BLD AUTO: 327 THOU/UL (ref 130–400)
RBC # BLD AUTO: 3.59 MILL/UL (ref 4.7–6.1)
WBC # BLD AUTO: 9 THOU/UL (ref 4.8–10.8)

## 2018-03-24 PROCEDURE — 0S9D3ZZ DRAINAGE OF LEFT KNEE JOINT, PERCUTANEOUS APPROACH: ICD-10-PCS | Performed by: ORTHOPAEDIC SURGERY

## 2018-03-24 RX ADMIN — VANCOMYCIN HYDROCHLORIDE SCH MLS: 1 INJECTION, SOLUTION INTRAVENOUS at 05:07

## 2018-03-24 RX ADMIN — CLINDAMYCIN PHOSPHATE SCH MLS: 900 INJECTION, SOLUTION INTRAVENOUS at 01:25

## 2018-03-24 RX ADMIN — HYDROCODONE BITARTRATE AND ACETAMINOPHEN PRN TAB: 10; 325 TABLET ORAL at 09:16

## 2018-03-24 RX ADMIN — CLINDAMYCIN PHOSPHATE SCH MLS: 900 INJECTION, SOLUTION INTRAVENOUS at 05:06

## 2018-09-14 NOTE — CT
CT ANGIOGRAM ABDOMEN AND PELVIS WITH IV CONTRAST AND 3D RECONSTRUCTIONS:

 

CT ANGIOGRAM BILATERAL LOWER EXTREMITIES WITH RUNOFF TO THE FEET WITH IV CONTRAST AND ED RECONSTRUCTI
ONS:

 

09/14/2018

 

HISTORY:

Right leg claudication.  Bilateral leg pain.  History of bilateral knee replacements.

 

COMPARISON:

CTA abdomen on 04/17/2014.

 

FINDINGS:

ABDOMEN AND PELVIS:  Mild vascular calcifications are seen in the abdominal aorta and involving the i
kathi arteries.  There is a right common iliac artery stent, which does appear patent.

 

The celiac and superior mesenteric arteries are patent.  The origin of the RICHARD is not well delineated
 but is also probably patent.  There are two patent bilateral renal arteries visualized.

 

The visualized lung bases are clear.

 

Post cholecystectomy changes are noted.  There is a subcentimeter, too-small-to-characterize, hypoden
se lesion seen at the lateral segment of the left hepatic lobe, stable from prior exam.  A subtle, ir
regular area of enhancement at the lateral aspect of the right hepatic lobe is seen.  This was also p
resent on the prior exam but is more conspicuous on the present study.  This does not have well defin
ed margins and could be related to transient hepatic arterial difference due to the arterial phase of
 imaging.

 

Subcentimeter, too-small-to-characterize, hypodense lesions are seen in each kidney.  A nonobstructin
g left renal calculus is again seen.

 

The spleen, pancreas, bilateral adrenal glands, and urinary bladder demonstrate a normal CT appearanc
e.  The prostate gland is mildly enlarged in transverse dimensions, measuring 5.5 cm.

 

Degenerative changes are seen in the spine.

 

RIGHT LOWER EXTREMITY:  Vascular calcifications are seen within the femoral arteries, but the right c
ommon femoral artery, as well as the profunda femoral artery, do appear patent.  There are stents see
n within the right superficial femoral artery, with the stent at the level of the mid thigh and exten
ding to near the level of the adductor canal.  The popliteal artery, at the level of the distal porti
on of the femoral component, is completely obscured due to artifact.  The popliteal artery, where vis
ualized, is patent.  The right anterior tibial artery occludes in the proximal calf, and there is two
 vessel runoff to the right lower extremity via the peroneal and posterior tibial arteries.

 

LEFT LOWER EXTREMITY:  There is dense, calcified, atherosclerotic plaque seen in the left common femo
ral artery, which obscures the lumen, and there is at least mild and possibly moderate narrowing of t
he left common femoral artery in this region.  There is moderate narrowing at the origin of the profu
nda femoral artery.  Mild narrowing at the origin of the left superficial femoral artery.  There is a
 vascular stent seen within the mid and distal left lower extremity superficial femoral artery.  This
 does appear patent.  There is obscuration of a portion of the left popliteal artery at the level of 
the femoral component of the left knee prosthesis, but the visualized left common femoral artery is p
atent.  The left anterior tibial artery occludes in the proximal calf.  There is two vessel runoff to
 the left lower extremity via the peroneal and posterior tibial arteries.

 

IMPRESSION:

1.  Atherosclerotic plaque and calcification within the abdominal aorta, iliac arteries, and involvin
g the lower extremity arterial vessels.

 

2.  Stents within the superficial femoral arteries bilaterally, which do appear patent.  There is obs
curation of a small portion of each popliteal artery due to significant streak artifact from bilatera
l knee prostheses.

 

3.  Two vessel runoff to the bilateral lower extremities via the peroneal and posterior tibial arteri
es.  The anterior tibial arteries occlude in the proximal calf, bilaterally.

 

4.  Nonobstructing left renal calculus.

 

5.  Findings likely related to a transient hepatic arterial difference within the right hepatic lobe,
 secondary to the arterial phase of imaging.

 

6.  Post cholecystectomy changes.

 

7.  Subcentimeter, too-small-to-characterize, hypodense lesion in the left hepatic lobe.

 

8.  Enlargement of the prostate gland.

 

POS: [facility]

## 2018-11-13 NOTE — OP
DATE OF PROCEDURE:  11/13/2018

 

PREOPERATIVE DIAGNOSIS:  Left common femoral artery stenosis.  

 

POSTOPERATIVE DIAGNOSES:  Left common femoral artery stenosis with plaque 
involving the orifices of the SFA and deep femoral branches.

 

PROCEDURE:  Extended left common femoral endarterectomy with bovine patch 
angioplasty.

 

SURGEON:  Gonzales Doyle M.D.

 

ANESTHESIA:  General.

 

ESTIMATED BLOOD LOSS:  Minimal.

 

PROCEDURE IN DETAIL:  After adequate anesthesia had been obtained, ultrasound 
was used to isolate the proposed area of surgery.  The patient was then prepped 
and draped.  Incision was made.  The patient was quite deep in this area with 
about 5 cm of adipose tissue prior to arrival to the inguinal ligament.  
Dissection was then carried distally into the superficial femoral artery.  
Adjacent clamp was placed up under the inguinal ligament, clamping the external 
iliac artery and controlling the 2 posterior profunda branches with a clamp.  
The superficial femoral aorta clamp and the lateral profunda branch with a 
loop.  Arteriotomy was then performed and endarterectomy was performed from the 
iliac  to the deep femoral branches and extending on the sup femoral artery.  A 
bovine patch was used to close the arteriotomy extending from the inguinal 
ligament on to the superficial femoral artery about 1.5 cm.  Following this the
, vessels were back flushed and forward flushed, the area irrigated and flow 
restored down the deep femoral arteries and then the superficial femoral 
artery.  Protamine was given to reverse the heparin.  We obtain good 
hemostasis.  The deep layers were closed with interrupted figure-of-eight 5-0 
sutures.

 

SHOLA

## 2018-11-13 NOTE — EKG
Test Reason : 

Blood Pressure : ***/*** mmHG

Vent. Rate : 061 BPM     Atrial Rate : 061 BPM

   P-R Int : 152 ms          QRS Dur : 092 ms

    QT Int : 400 ms       P-R-T Axes : 060 076 077 degrees

   QTc Int : 402 ms

 

Normal sinus rhythm

Normal ECG

When compared with ECG of 06-MAR-2018 08:56,

No significant change was found

Confirmed by DR. YOAN GUTIERRES ([id number]) on 11/13/2018 8:50:32 AM

 

Referred By:  LAMINE           Confirmed By:DR. YOAN GUTIERRES

## 2018-11-14 NOTE — DIS
This is a 63-year-old who underwent a left common femoral endarterectomy on the day prior.  His posto
perative course was unremarkable.  He did have a posterior tibial pulse, which had not been palpable 
preoperatively.  He had some mild erythema around his incision at the day of discharge which was the 
day following surgery with no peripheral edema.  He has no complaints.  He is ambulating and voiding 
well.  He will be followed up in 2-3 weeks.  Consideration is being given by Dr. Antony to neha caputo in-stent stenosis distally in that leg.

## 2020-09-09 NOTE — EKG
Test Reason : 

Blood Pressure : ***/*** mmHG

Vent. Rate : 056 BPM     Atrial Rate : 056 BPM

   P-R Int : 148 ms          QRS Dur : 090 ms

    QT Int : 416 ms       P-R-T Axes : 062 079 073 degrees

   QTc Int : 401 ms

 

Sinus bradycardia

Otherwise normal ECG

No previous ECGs available

Confirmed by CAROLE ZACARIAS ([id number]) on 9/9/2020 8:52:12 PM

 

Referred By:  RAMÓN           Confirmed By:CAROLE ZACARIAS

## 2020-09-11 NOTE — OP
DATE OF PROCEDURE:  09/11/2020



PREOPERATIVE DIAGNOSIS:  BPH with urinary obstruction.



POSTOPERATIVE DIAGNOSIS:  BPH with urinary obstruction.



PROCEDURE PERFORMED:  UroLift with 4 implants.



INDICATION FOR PROCEDURE:  Mr. Mccollum is a 65-year-old white male with BPH and

urinary complaints.  He is currently on maximal medical therapy.  He did not wish to

remain on medications and wanted to do the UroLift instead.  We discussed risks and

benefits, and he has agreed to proceed forward. 



DESCRIPTION OF PROCEDURE:  After identification of armband and verification of

consent, the patient was brought back to the operating room, where he underwent

total intravenous anesthesia.  He was then placed in dorsal lithotomy position and

prepped and draped in usual sterile fashion.  After appropriate time-out, a

21-[place]izen [place] rigid cystoscope with visual obturator was introduced per urethra into the

bladder.  The meatus did require dilation for introduction of the camera

atraumatically.  Once the cystoscope was introduced into the bladder, the prostate

did appear hypertrophic and had previously been demonstrated on outpatient

cystoscopy.  The visual obturator was switched out for the UroLift implant device.

The first implant was placed to the patient's left base of the prostate far enough

away from the bladder neck.  Anterior and lateral compression were achieved and then

the safety released.  The blue trigger was fired to deploy the Nitinol needle.  The

tension was set and the capsular tab was placed with the gray trigger.  The UroLift

was then advanced forward until the white line was in the keyhole and the urethral

end piece deployed using the back blue trigger.  This was then repeated on the

patient's right side toward the base of the prostate.  Two additional implants were

then placed at the patient's apex of the prostate.  At this point, the prostate was

wide open.  I did not feel there was any need to put additional implants in.

Bleeding was noted to be extremely minimal.  The cystoscope was then removed.  An

18-Citizen [place] Nolasco catheter was placed with ease into the patient's bladder.  10 mL of

sterile water was placed into the balloon.  The catheter was left to gravity

drainage.  B and O suppository were placed in his rectum.  The patient was taken out

of position, awakened, taken to PACU for recovery in stable condition. 



COMPLICATIONS:  None.



ESTIMATED BLOOD LOSS:  Minimal.



RETAINED TUBES AND DRAINS:  18-Citizen [place] Nolasco catheter to gravity drainage.



SPECIMENS:  None.



IMPLANTS USED:  4.



DISPOSITION:  The patient may consider a void trial if his urine is clear enough, we

will then plan for followup on an outpatient basis.  If the urine is bloody, we will

send him home with a catheter and do a voiding trial next week. 







Job ID:  [id number]

## 2022-05-23 ENCOUNTER — HOSPITAL ENCOUNTER (OUTPATIENT)
Dept: HOSPITAL 92 - SLEEPLAB | Age: 67
Discharge: HOME | End: 2022-05-23
Attending: FAMILY MEDICINE
Payer: MEDICARE

## 2022-05-23 DIAGNOSIS — I10: ICD-10-CM

## 2022-05-23 DIAGNOSIS — G47.31: ICD-10-CM

## 2022-05-23 DIAGNOSIS — G47.33: ICD-10-CM

## 2022-05-23 DIAGNOSIS — F41.9: ICD-10-CM

## 2022-05-23 DIAGNOSIS — G47.00: ICD-10-CM

## 2022-05-23 DIAGNOSIS — R06.83: Primary | ICD-10-CM

## 2022-05-23 PROCEDURE — 95810 POLYSOM 6/> YRS 4/> PARAM: CPT

## 2022-06-01 ENCOUNTER — HOSPITAL ENCOUNTER (OUTPATIENT)
Dept: HOSPITAL 92 - SLEEPLAB | Age: 67
Discharge: HOME | End: 2022-06-01
Attending: FAMILY MEDICINE
Payer: MEDICARE

## 2022-06-01 DIAGNOSIS — E66.9: ICD-10-CM

## 2022-06-01 DIAGNOSIS — R06.83: ICD-10-CM

## 2022-06-01 DIAGNOSIS — G47.33: Primary | ICD-10-CM

## 2022-06-01 DIAGNOSIS — G47.10: ICD-10-CM

## 2022-06-01 DIAGNOSIS — G47.31: ICD-10-CM

## 2022-06-01 DIAGNOSIS — F41.9: ICD-10-CM

## 2022-06-01 DIAGNOSIS — G47.00: ICD-10-CM

## 2022-06-01 PROCEDURE — 95811 POLYSOM 6/>YRS CPAP 4/> PARM: CPT

## 2023-08-22 ENCOUNTER — HOSPITAL ENCOUNTER (OUTPATIENT)
Dept: HOSPITAL 92 - BICCT | Age: 68
Discharge: HOME | End: 2023-08-22
Attending: INTERNAL MEDICINE
Payer: MEDICARE

## 2023-08-22 DIAGNOSIS — I70.8: ICD-10-CM

## 2023-08-22 DIAGNOSIS — I73.9: Primary | ICD-10-CM

## 2023-08-22 DIAGNOSIS — I70.0: ICD-10-CM

## 2023-08-22 DIAGNOSIS — Z90.49: ICD-10-CM

## 2023-08-22 DIAGNOSIS — Z96.643: ICD-10-CM

## 2023-08-22 DIAGNOSIS — I70.201: ICD-10-CM

## 2023-08-22 DIAGNOSIS — I77.1: ICD-10-CM

## 2023-08-22 DIAGNOSIS — K76.89: ICD-10-CM

## 2023-08-22 DIAGNOSIS — N28.89: ICD-10-CM

## 2023-08-22 DIAGNOSIS — Z95.828: ICD-10-CM

## 2023-08-22 PROCEDURE — 75635 CT ANGIO ABDOMINAL ARTERIES: CPT

## 2024-01-19 ENCOUNTER — HOSPITAL ENCOUNTER (OUTPATIENT)
Dept: HOSPITAL 92 - CSHMRI | Age: 69
Discharge: HOME | End: 2024-01-19
Attending: ORTHOPAEDIC SURGERY
Payer: MEDICARE

## 2024-01-19 DIAGNOSIS — M87.9: Primary | ICD-10-CM

## 2024-01-19 DIAGNOSIS — R93.6: ICD-10-CM

## 2024-01-19 PROCEDURE — 72195 MRI PELVIS W/O DYE: CPT

## 2024-12-11 ENCOUNTER — HOSPITAL ENCOUNTER (OUTPATIENT)
Dept: HOSPITAL 92 - CSHCT | Age: 69
Discharge: HOME | End: 2024-12-11
Attending: FAMILY MEDICINE
Payer: MEDICARE

## 2024-12-11 DIAGNOSIS — Z87.891: ICD-10-CM

## 2024-12-11 DIAGNOSIS — R91.1: ICD-10-CM

## 2024-12-11 DIAGNOSIS — Z12.2: Primary | ICD-10-CM

## 2024-12-11 PROCEDURE — 71271 CT THORAX LUNG CANCER SCR C-: CPT
